# Patient Record
Sex: FEMALE | Race: ASIAN | Employment: FULL TIME | URBAN - METROPOLITAN AREA
[De-identification: names, ages, dates, MRNs, and addresses within clinical notes are randomized per-mention and may not be internally consistent; named-entity substitution may affect disease eponyms.]

---

## 2022-01-02 ENCOUNTER — OFFICE VISIT (OUTPATIENT)
Dept: URGENT CARE | Age: 37
End: 2022-01-02
Payer: COMMERCIAL

## 2022-01-02 VITALS — HEART RATE: 79 BPM | OXYGEN SATURATION: 99 % | TEMPERATURE: 98.2 F | RESPIRATION RATE: 16 BRPM

## 2022-01-02 DIAGNOSIS — Z20.822 ENCOUNTER FOR SCREENING LABORATORY TESTING FOR COVID-19 VIRUS: Primary | ICD-10-CM

## 2022-01-02 LAB — SARS-COV-2 POC: NEGATIVE

## 2022-01-02 PROCEDURE — 87426 SARSCOV CORONAVIRUS AG IA: CPT | Performed by: FAMILY MEDICINE

## 2022-01-02 PROCEDURE — 99211 OFF/OP EST MAY X REQ PHY/QHP: CPT | Performed by: FAMILY MEDICINE

## 2022-12-05 ENCOUNTER — OFFICE VISIT (OUTPATIENT)
Dept: URGENT CARE | Age: 37
End: 2022-12-05
Payer: COMMERCIAL

## 2022-12-05 VITALS
DIASTOLIC BLOOD PRESSURE: 73 MMHG | TEMPERATURE: 98.4 F | SYSTOLIC BLOOD PRESSURE: 110 MMHG | HEART RATE: 88 BPM | OXYGEN SATURATION: 97 % | HEIGHT: 61 IN | RESPIRATION RATE: 18 BRPM | BODY MASS INDEX: 32.85 KG/M2 | WEIGHT: 174 LBS

## 2022-12-05 DIAGNOSIS — J40 BRONCHITIS: Primary | ICD-10-CM

## 2022-12-05 LAB
HCG URINE, QL. (POC): NEGATIVE
VALID INTERNAL CONTROL?: YES

## 2022-12-05 PROCEDURE — 81025 URINE PREGNANCY TEST: CPT | Performed by: NURSE PRACTITIONER

## 2022-12-05 PROCEDURE — 99213 OFFICE O/P EST LOW 20 MIN: CPT | Performed by: NURSE PRACTITIONER

## 2022-12-05 RX ORDER — BENZONATATE 200 MG/1
200 CAPSULE ORAL
Qty: 20 CAPSULE | Refills: 0 | Status: SHIPPED | OUTPATIENT
Start: 2022-12-05 | End: 2022-12-12

## 2022-12-05 RX ORDER — PROMETHAZINE HYDROCHLORIDE AND DEXTROMETHORPHAN HYDROBROMIDE 6.25; 15 MG/5ML; MG/5ML
5 SYRUP ORAL
Qty: 118 ML | Refills: 0 | Status: SHIPPED | OUTPATIENT
Start: 2022-12-05

## 2022-12-05 RX ORDER — AZITHROMYCIN 250 MG/1
TABLET, FILM COATED ORAL
Qty: 6 TABLET | Refills: 0 | Status: SHIPPED | OUTPATIENT
Start: 2022-12-05

## 2022-12-05 NOTE — PATIENT INSTRUCTIONS
XR Results (most recent):  Results from Appointment encounter on 12/05/22    XR CHEST PA LAT    Narrative  EXAM: XR CHEST PA LAT    INDICATION: Cough and fever    COMPARISON: None    TECHNIQUE: PA and lateral chest views    FINDINGS: The cardiac size is within normal limits. The pulmonary vasculature is  within normal limits. The lungs and pleural spaces are clear. The visualized bones and upper abdomen  are age-appropriate. Impression  No evidence of acute process.

## 2022-12-05 NOTE — PROGRESS NOTES
Cough  Associated symptoms include sore throat. Pertinent negatives include no chest pain, no shortness of breath, no wheezing and no nausea. Pt presents with spouse with complaints of cough, sore throat, myalgias for 7 days. Cough is productive of phlegm. Fevers 99.6 last couple days. Some chest tightness. No sob or wheezing. Taking otc cough/cold meds and tried natural remedies without relief. History reviewed. No pertinent past medical history. History reviewed. No pertinent surgical history. History reviewed. No pertinent family history. Social History     Socioeconomic History    Marital status: SINGLE     Spouse name: Not on file    Number of children: Not on file    Years of education: Not on file    Highest education level: Not on file   Occupational History    Not on file   Tobacco Use    Smoking status: Never    Smokeless tobacco: Never   Substance and Sexual Activity    Alcohol use: Not on file    Drug use: Not on file    Sexual activity: Not on file   Other Topics Concern    Not on file   Social History Narrative    Not on file     Social Determinants of Health     Financial Resource Strain: Not on file   Food Insecurity: Not on file   Transportation Needs: Not on file   Physical Activity: Not on file   Stress: Not on file   Social Connections: Not on file   Intimate Partner Violence: Not on file   Housing Stability: Not on file                ALLERGIES: Patient has no known allergies. Review of Systems   Constitutional:  Positive for fatigue and fever. HENT:  Positive for congestion and sore throat. Negative for sinus pressure and sinus pain. Respiratory:  Positive for cough and chest tightness. Negative for shortness of breath and wheezing. Cardiovascular:  Negative for chest pain. Gastrointestinal:  Negative for abdominal pain, diarrhea and nausea.      Vitals:    12/05/22 0947   BP: 110/73   Pulse: 88   Resp: 18   Temp: 98.4 °F (36.9 °C)   SpO2: 97%   Weight: 174 lb (78.9 kg)   Height: 5' 1\" (1.549 m)       Physical Exam  Constitutional:       General: She is not in acute distress. Appearance: Normal appearance. She is well-developed. She is not ill-appearing or toxic-appearing. HENT:      Head: Normocephalic and atraumatic. Right Ear: Tympanic membrane, ear canal and external ear normal.      Left Ear: Tympanic membrane, ear canal and external ear normal.      Nose: Nose normal.      Mouth/Throat:      Mouth: Mucous membranes are moist.      Pharynx: Oropharynx is clear. Eyes:      Extraocular Movements: Extraocular movements intact. Conjunctiva/sclera: Conjunctivae normal.      Pupils: Pupils are equal, round, and reactive to light. Cardiovascular:      Rate and Rhythm: Normal rate and regular rhythm. Heart sounds: Normal heart sounds. Pulmonary:      Effort: Pulmonary effort is normal.      Breath sounds: Normal breath sounds. Musculoskeletal:      Cervical back: Normal range of motion and neck supple. Lymphadenopathy:      Cervical: No cervical adenopathy. Skin:     General: Skin is warm and dry. Neurological:      General: No focal deficit present. Mental Status: She is alert and oriented to person, place, and time. Results for orders placed or performed in visit on 12/05/22   AMB POC URINE PREGNANCY TEST, VISUAL COLOR COMPARISON   Result Value Ref Range    VALID INTERNAL CONTROL POC Yes     HCG urine, Ql. (POC) Negative Negative     XR Results (most recent):  Results from Appointment encounter on 12/05/22    XR CHEST PA LAT    Narrative  EXAM: XR CHEST PA LAT    INDICATION: Cough and fever    COMPARISON: None    TECHNIQUE: PA and lateral chest views    FINDINGS: The cardiac size is within normal limits. The pulmonary vasculature is  within normal limits. The lungs and pleural spaces are clear. The visualized bones and upper abdomen  are age-appropriate. Impression  No evidence of acute process.       ICD-10-CM ICD-9-CM 1. Bronchitis  J40 490       Orders Placed This Encounter    XR CHEST PA LAT     Standing Status:   Future     Number of Occurrences:   1     Standing Expiration Date:   1/5/2024     Order Specific Question:   Is Patient Pregnant? Answer:   No     Order Specific Question:   Reason for Exam     Answer:   cough, fever    AMB POC URINE PREGNANCY TEST, VISUAL COLOR COMPARISON    azithromycin (ZITHROMAX) 250 mg tablet     Sig: Take two tablets today then one tablet daily on Days 2-5     Dispense:  6 Tablet     Refill:  0    benzonatate (TESSALON) 200 mg capsule     Sig: Take 1 Capsule by mouth three (3) times daily as needed for Cough for up to 7 days. Dispense:  20 Capsule     Refill:  0    promethazine-dextromethorphan (PROMETHAZINE-DM) 6.25-15 mg/5 mL syrup     Sig: Take 5 mL by mouth every four (4) hours as needed for Cough. Dispense:  118 mL     Refill:  0        The patient is to follow up with PCP. If signs and symptoms become worse the pt is to go to the ER.      Nazario Kay NP       MDM    Procedures